# Patient Record
(demographics unavailable — no encounter records)

---

## 2024-12-06 NOTE — PLAN
[FreeTextEntry1] : Annual gyn exam - Pap/HPV/STD sent; Dyspareunia - Rx Astroglide sent to pharmacy / Generous application of vaginal lubricant during intercourse d/w pt.  -I spent a total of 50 minutes on date of the encounter reviewing patient's medical / surgical and Ob/Gyn history, evaluating, counseling and treating the patient.

## 2024-12-06 NOTE — PHYSICAL EXAM
[Chaperone Present] : A chaperone was present in the examining room during all aspects of the physical examination [[id number]] : A chaperone was present during the pelvic exam. [Appropriately responsive] : appropriately responsive [Alert] : alert [No Acute Distress] : no acute distress [No Lymphadenopathy] : no lymphadenopathy [Soft] : soft [Non-tender] : non-tender [Non-distended] : non-distended [No HSM] : No HSM [No Lesions] : no lesions [No Mass] : no mass [Oriented x3] : oriented x3 [Examination Of The Breasts] : a normal appearance [No Masses] : no breast masses were palpable [Labia Majora] : normal [Labia Minora] : normal [Normal] : normal

## 2024-12-06 NOTE — HISTORY OF PRESENT ILLNESS
[Y] : Positive pregnancy history [Normal Amount/Duration] :  normal amount and duration [FreeTextEntry1] : 11/26/24